# Patient Record
Sex: FEMALE | Race: WHITE | Employment: FULL TIME | URBAN - NONMETROPOLITAN AREA
[De-identification: names, ages, dates, MRNs, and addresses within clinical notes are randomized per-mention and may not be internally consistent; named-entity substitution may affect disease eponyms.]

---

## 2019-10-29 ENCOUNTER — OFFICE VISIT (OUTPATIENT)
Dept: ORTHOPEDIC SURGERY | Age: 29
End: 2019-10-29
Payer: COMMERCIAL

## 2019-10-29 VITALS
HEART RATE: 96 BPM | SYSTOLIC BLOOD PRESSURE: 109 MMHG | HEIGHT: 66 IN | DIASTOLIC BLOOD PRESSURE: 70 MMHG | WEIGHT: 132.06 LBS | BODY MASS INDEX: 21.22 KG/M2

## 2019-10-29 DIAGNOSIS — M25.512 LEFT SHOULDER PAIN, UNSPECIFIED CHRONICITY: ICD-10-CM

## 2019-10-29 DIAGNOSIS — M25.312 INSTABILITY OF LEFT SHOULDER JOINT: Primary | ICD-10-CM

## 2019-10-29 PROCEDURE — 99243 OFF/OP CNSLTJ NEW/EST LOW 30: CPT | Performed by: ORTHOPAEDIC SURGERY

## 2020-11-08 ENCOUNTER — APPOINTMENT (OUTPATIENT)
Dept: GENERAL RADIOLOGY | Age: 30
End: 2020-11-08
Payer: COMMERCIAL

## 2020-11-08 ENCOUNTER — HOSPITAL ENCOUNTER (EMERGENCY)
Age: 30
Discharge: HOME | End: 2020-11-08
Attending: STUDENT IN AN ORGANIZED HEALTH CARE EDUCATION/TRAINING PROGRAM
Payer: COMMERCIAL

## 2020-11-08 VITALS
TEMPERATURE: 98.3 F | SYSTOLIC BLOOD PRESSURE: 130 MMHG | BODY MASS INDEX: 21.99 KG/M2 | DIASTOLIC BLOOD PRESSURE: 74 MMHG | OXYGEN SATURATION: 99 % | RESPIRATION RATE: 18 BRPM | HEART RATE: 78 BPM | HEIGHT: 65 IN | WEIGHT: 132 LBS

## 2020-11-08 LAB
A/G RATIO: 2.2 (ref 1.1–2.2)
ALBUMIN SERPL-MCNC: 5.2 G/DL (ref 3.4–5)
ALP BLD-CCNC: 68 U/L (ref 40–129)
ALT SERPL-CCNC: 18 U/L (ref 10–40)
ANION GAP SERPL CALCULATED.3IONS-SCNC: 13 MMOL/L (ref 3–16)
AST SERPL-CCNC: 22 U/L (ref 15–37)
BASOPHILS ABSOLUTE: 0.1 K/UL (ref 0–0.2)
BASOPHILS RELATIVE PERCENT: 0.4 %
BILIRUB SERPL-MCNC: <0.2 MG/DL (ref 0–1)
BILIRUBIN URINE: NEGATIVE
BLOOD, URINE: NEGATIVE
BUN BLDV-MCNC: 6 MG/DL (ref 7–20)
CALCIUM SERPL-MCNC: 9.8 MG/DL (ref 8.3–10.6)
CHLORIDE BLD-SCNC: 99 MMOL/L (ref 99–110)
CLARITY: CLEAR
CO2: 24 MMOL/L (ref 21–32)
COLOR: YELLOW
CREAT SERPL-MCNC: 0.7 MG/DL (ref 0.6–1.1)
EOSINOPHILS ABSOLUTE: 0 K/UL (ref 0–0.6)
EOSINOPHILS RELATIVE PERCENT: 0 %
GFR AFRICAN AMERICAN: >60
GFR NON-AFRICAN AMERICAN: >60
GLOBULIN: 2.4 G/DL
GLUCOSE BLD-MCNC: 93 MG/DL (ref 70–99)
GLUCOSE URINE: NEGATIVE MG/DL
HCG QUALITATIVE: NEGATIVE
HCT VFR BLD CALC: 42.2 % (ref 36–48)
HEMOGLOBIN: 14.4 G/DL (ref 12–16)
KETONES, URINE: NEGATIVE MG/DL
LEUKOCYTE ESTERASE, URINE: NEGATIVE
LYMPHOCYTES ABSOLUTE: 2.2 K/UL (ref 1–5.1)
LYMPHOCYTES RELATIVE PERCENT: 12.1 %
MCH RBC QN AUTO: 32.1 PG (ref 26–34)
MCHC RBC AUTO-ENTMCNC: 34 G/DL (ref 31–36)
MCV RBC AUTO: 94.6 FL (ref 80–100)
MICROSCOPIC EXAMINATION: NORMAL
MONOCYTES ABSOLUTE: 0.8 K/UL (ref 0–1.3)
MONOCYTES RELATIVE PERCENT: 4.5 %
NEUTROPHILS ABSOLUTE: 15.1 K/UL (ref 1.7–7.7)
NEUTROPHILS RELATIVE PERCENT: 83 %
NITRITE, URINE: NEGATIVE
PDW BLD-RTO: 12.7 % (ref 12.4–15.4)
PH UA: 5.5 (ref 5–8)
PLATELET # BLD: 396 K/UL (ref 135–450)
PMV BLD AUTO: 7.9 FL (ref 5–10.5)
POTASSIUM REFLEX MAGNESIUM: 4 MMOL/L (ref 3.5–5.1)
PROTEIN UA: NEGATIVE MG/DL
RBC # BLD: 4.47 M/UL (ref 4–5.2)
SODIUM BLD-SCNC: 136 MMOL/L (ref 136–145)
SPECIFIC GRAVITY UA: <=1.005 (ref 1–1.03)
TOTAL PROTEIN: 7.6 G/DL (ref 6.4–8.2)
URINE TYPE: NORMAL
UROBILINOGEN, URINE: 0.2 E.U./DL
WBC # BLD: 18.2 K/UL (ref 4–11)

## 2020-11-08 PROCEDURE — 71045 X-RAY EXAM CHEST 1 VIEW: CPT

## 2020-11-08 PROCEDURE — 85025 COMPLETE CBC W/AUTO DIFF WBC: CPT

## 2020-11-08 PROCEDURE — 36415 COLL VENOUS BLD VENIPUNCTURE: CPT

## 2020-11-08 PROCEDURE — 99285 EMERGENCY DEPT VISIT HI MDM: CPT

## 2020-11-08 PROCEDURE — 80053 COMPREHEN METABOLIC PANEL: CPT

## 2020-11-08 PROCEDURE — 84703 CHORIONIC GONADOTROPIN ASSAY: CPT

## 2020-11-08 PROCEDURE — 81003 URINALYSIS AUTO W/O SCOPE: CPT

## 2020-11-08 PROCEDURE — 93005 ELECTROCARDIOGRAM TRACING: CPT | Performed by: STUDENT IN AN ORGANIZED HEALTH CARE EDUCATION/TRAINING PROGRAM

## 2020-11-08 PROCEDURE — U0003 INFECTIOUS AGENT DETECTION BY NUCLEIC ACID (DNA OR RNA); SEVERE ACUTE RESPIRATORY SYNDROME CORONAVIRUS 2 (SARS-COV-2) (CORONAVIRUS DISEASE [COVID-19]), AMPLIFIED PROBE TECHNIQUE, MAKING USE OF HIGH THROUGHPUT TECHNOLOGIES AS DESCRIBED BY CMS-2020-01-R: HCPCS

## 2020-11-08 RX ORDER — CHOLECALCIFEROL (VITAMIN D3) 125 MCG
500 CAPSULE ORAL DAILY
COMMUNITY

## 2020-11-08 RX ORDER — BUTALBITAL, ACETAMINOPHEN, CAFFEINE AND CODEINE PHOSPHATE 300; 50; 40; 30 MG/1; MG/1; MG/1; MG/1
1 CAPSULE ORAL EVERY 4 HOURS PRN
COMMUNITY

## 2020-11-08 RX ORDER — DOXYCYCLINE HYCLATE 100 MG/1
100 CAPSULE ORAL 2 TIMES DAILY
COMMUNITY

## 2020-11-08 RX ORDER — MODAFINIL 100 MG/1
100 TABLET ORAL DAILY
COMMUNITY

## 2020-11-08 RX ORDER — FLUOXETINE HYDROCHLORIDE 20 MG/1
40 CAPSULE ORAL DAILY
COMMUNITY

## 2020-11-08 RX ORDER — MULTIVIT WITH MINERALS/LUTEIN
250 TABLET ORAL DAILY
COMMUNITY

## 2020-11-08 ASSESSMENT — VISUAL ACUITY
OD: 20/25
OU: 20/25
OS: 20/30

## 2020-11-08 ASSESSMENT — PAIN DESCRIPTION - DESCRIPTORS: DESCRIPTORS: CONSTANT;PRESSURE

## 2020-11-08 ASSESSMENT — PAIN SCALES - GENERAL: PAINLEVEL_OUTOF10: 4

## 2020-11-08 ASSESSMENT — PAIN DESCRIPTION - LOCATION: LOCATION: HEAD

## 2020-11-08 NOTE — ED NOTES
Pt states \"I just don't feel right. \" States onset of s/s x approx 1 wk. Pt denies fevers, SOB or loss of taste or smell.  Resps even and unlab     Nette Castaneda RN  11/08/20 9701

## 2020-11-08 NOTE — ED PROVIDER NOTES
[facility] EMERGENCY DEPARTMENT      CHIEF COMPLAINT  Numbness (C/o HA with BLE numbness and general weakness x approx 1 wk)       HISTORY OF PRESENT ILLNESS  Tiara Dewitt is a 31 y.o. female with a past medical history of IBS and MS, who presents to the ED complaining of URI symptoms. Reports fatigue and malaise. Reports loss of sense of taste and smell- 1 week ago. Reports nonproductive cough, fever (Tmax 100.1 on Friday). Tested for flu and covid 2 days ago- negative     Chronic diarrhea from IBS. Patient reports generalized numbness that has been progressive over the past week. She reports that she feels her baseline neuropathy in hands and feet is worse than baseline. She reports intermittent mild vision changes- none currently. She states it feels like previous MS exacerbations but worse. She is on steroids from her PCP.     Her Neurologist is with [facility], was unable to see him because he's on vacation. She denies shortness of breath, chest pain, focal numbness or weakness. No other complaints, modifying factors or associated symptoms. I have reviewed the following from the nursing documentation. Past Medical History:   Diagnosis Date    Anxiety     Depression     IBS (irritable bowel syndrome)     MS (multiple sclerosis) (HCC)     Tonsillitis      Past Surgical History:   Procedure Laterality Date    COLONOSCOPY      MOUTH SURGERY      TONSILLECTOMY       History reviewed. No pertinent family history.   Social History     Socioeconomic History    Marital status: Single     Spouse name: Not on file    Number of children: Not on file    Years of education: Not on file    Highest education level: Not on file   Occupational History    Not on file   Social Needs    Financial resource strain: Not on file    Food insecurity     Worry: Not on file     Inability: Not on file    Transportation needs     Medical: Not on file     Non-medical: Not on file   Tobacco Use    Smoking status: Current Every Day Smoker     Packs/day: 1.00     Types: Cigarettes    Smokeless tobacco: Never Used   Substance and Sexual Activity    Alcohol use: No    Drug use: No    Sexual activity: Not Currently   Lifestyle    Physical activity     Days per week: Not on file     Minutes per session: Not on file    Stress: Not on file   Relationships    Social connections     Talks on phone: Not on file     Gets together: Not on file     Attends Jewish service: Not on file     Active member of club or organization: Not on file     Attends meetings of clubs or organizations: Not on file     Relationship status: Not on file    Intimate partner violence     Fear of current or ex partner: Not on file     Emotionally abused: Not on file     Physically abused: Not on file     Forced sexual activity: Not on file   Other Topics Concern    Not on file   Social History Narrative    Not on file     No current facility-administered medications for this encounter. Current Outpatient Medications   Medication Sig Dispense Refill    FLUoxetine (PROZAC) 20 MG capsule Take 40 mg by mouth daily      methylPREDNISolone (MEDROL PO) Take by mouth States she started yesterday      doxycycline hyclate (VIBRAMYCIN) 100 MG capsule Take 100 mg by mouth 2 times daily States she only has taken 1 dose      vitamin B-12 (CYANOCOBALAMIN) 500 MCG tablet Take 500 mcg by mouth daily      Ascorbic Acid (VITAMIN C) 250 MG tablet Take 250 mg by mouth daily      butalbital-acetaminophen-caffeine-codeine (FIORICET/CODEINE) -52-30 MG per capsule Take 1 capsule by mouth every 4 hours as needed.  loperamide (IMODIUM) 1 MG/5ML solution Take by mouth 4 times daily as needed for Diarrhea      modafinil (PROVIGIL) 100 MG tablet Take 100 mg by mouth daily.       gabapentin (NEURONTIN) 100 MG capsule Take 1 capsule by mouth 3 times daily for 15 days (Patient taking differently: Take 600 mg by mouth 3 times daily. ) 45 capsule 0    levonorgestrel-ethinyl estradiol (AVIANE) 0.1-20 MG-MCG per tablet Take 1 tablet by mouth daily 1 packet 11     Allergies   Allergen Reactions    Ciprofloxacin Shortness Of Breath    Azithromycin Diarrhea    Sulfamethoxazole-Trimethoprim        REVIEW OF SYSTEMS  10 systems reviewed, pertinent positives per HPI otherwise noted to be negative. PHYSICAL EXAM  /75   Pulse 90   Temp 98.5 °F (36.9 °C)   Resp 18   Ht 5' 5\" (1.651 m)   Wt 132 lb (59.9 kg)   LMP 09/15/2020   SpO2 100%   BMI 21.97 kg/m²    GENERAL APPEARANCE: Awake and alert. Cooperative. no distress. HENT: Normocephalic. Atraumatic. Mucous membranes are moist  NECK: Supple. Full range of motion of the neck without stiffness or pain. EYES: PERRL. EOM's grossly intact. Visual acuity R 20/25; L 20/30  HEART/CHEST: RRR. No murmurs. Chest wall is not tender to palpation. LUNGS: Respirations unlabored. CTAB. Good air exchange. Speaking comfortably in full sentences. ABDOMEN: No tenderness. Soft. Non-distended. No masses. No organomegaly. No guarding or rebound. MUSCULOSKELETAL: No extremity edema. Compartments soft. No deformity. No tenderness in the extremities. All extremities neurovascularly intact. SKIN: Warm and dry. No acute rashes. NEUROLOGICAL: Alert and oriented. CN's 2-12 intact. No gross facial drooping. Strength 5/5, sensation intact. NIH SS of 0.  PSYCHIATRIC: Normal mood and affect. LABS  I have reviewed all labs for this visit.    Results for orders placed or performed during the hospital encounter of 11/08/20   CBC Auto Differential   Result Value Ref Range    WBC 18.2 (H) 4.0 - 11.0 K/uL    RBC 4.47 4.00 - 5.20 M/uL    Hemoglobin 14.4 12.0 - 16.0 g/dL    Hematocrit 42.2 36.0 - 48.0 %    MCV 94.6 80.0 - 100.0 fL    MCH 32.1 26.0 - 34.0 pg    MCHC 34.0 31.0 - 36.0 g/dL    RDW 12.7 12.4 - 15.4 %    Platelets 002 866 - 406 K/uL    MPV 7.9 5.0 - 10.5 fL    Neutrophils % 83.0 %    Lymphocytes % 12.1 % Monocytes % 4.5 %    Eosinophils % 0.0 %    Basophils % 0.4 %    Neutrophils Absolute 15.1 (H) 1.7 - 7.7 K/uL    Lymphocytes Absolute 2.2 1.0 - 5.1 K/uL    Monocytes Absolute 0.8 0.0 - 1.3 K/uL    Eosinophils Absolute 0.0 0.0 - 0.6 K/uL    Basophils Absolute 0.1 0.0 - 0.2 K/uL   Comprehensive Metabolic Panel w/ Reflex to MG   Result Value Ref Range    Sodium 136 136 - 145 mmol/L    Potassium reflex Magnesium 4.0 3.5 - 5.1 mmol/L    Chloride 99 99 - 110 mmol/L    CO2 24 21 - 32 mmol/L    Anion Gap 13 3 - 16    Glucose 93 70 - 99 mg/dL    BUN 6 (L) 7 - 20 mg/dL    CREATININE 0.7 0.6 - 1.1 mg/dL    GFR Non-African American >60 >60    GFR African American >60 >60    Calcium 9.8 8.3 - 10.6 mg/dL    Total Protein 7.6 6.4 - 8.2 g/dL    Alb 5.2 (H) 3.4 - 5.0 g/dL    Albumin/Globulin Ratio 2.2 1.1 - 2.2    Total Bilirubin <0.2 0.0 - 1.0 mg/dL    Alkaline Phosphatase 68 40 - 129 U/L    ALT 18 10 - 40 U/L    AST 22 15 - 37 U/L    Globulin 2.4 g/dL   Urinalysis, reflex to microscopic   Result Value Ref Range    Color, UA Yellow Straw/Yellow    Clarity, UA Clear Clear    Glucose, Ur Negative Negative mg/dL    Bilirubin Urine Negative Negative    Ketones, Urine Negative Negative mg/dL    Specific Gravity, UA <=1.005 1.005 - 1.030    Blood, Urine Negative Negative    pH, UA 5.5 5.0 - 8.0    Protein, UA Negative Negative mg/dL    Urobilinogen, Urine 0.2 <2.0 E.U./dL    Nitrite, Urine Negative Negative    Leukocyte Esterase, Urine Negative Negative    Microscopic Examination Not Indicated     Urine Type NotGiven    HCG Qualitative, Serum   Result Value Ref Range    hCG Qual Negative Detects HCG level >10 MIU/mL   COVID-19   Result Value Ref Range    SARS-CoV-2, PCR Not Detected Not Detected   EKG 12 Lead   Result Value Ref Range    Ventricular Rate 91 BPM    Atrial Rate 91 BPM    P-R Interval 140 ms    QRS Duration 80 ms    Q-T Interval 348 ms    QTc Calculation (Bazett) 428 ms    P Axis 74 degrees    R Axis 67 degrees    T Axis 70 degrees    Diagnosis       Normal sinus rhythmNormal ECGNo previous ECGs availableConfirmed by Annmarie Kim MD, [address] ([id number]) on 11/9/2020 5:40:41 AM       ECG  The Ekg interpreted by me shows  normal sinus rhythm with a rate of 91  Axis is   Normal  QTc is  within an acceptable range  Intervals and Durations are unremarkable. ST Segments: no acute change  No significant change from prior EKG dated 11/14/12    RADIOLOGY    XR CHEST PORTABLE   Final Result   No acute cardiopulmonary disease. ED COURSE / MDM  Patient seen and evaluated. Old records reviewed. Labs and imaging reviewed and results discussed with patient. Overall well appearing patient, in no acute distress, presenting for hx of MS and URI symptoms that she thinks are exacerbating her MS symptoms. Physical exam remarkable for intact neurologic exam.     Differential diagnosis includes but is not limited to: MS flare, electrolyte abnormalities, COVID, pneumonia, UTI, viral illness doubt stroke, meningitis     EKG, laboratory studies, and chest x-ray obtained. ED Course as of Nov 11 1635   Rosendo Crenshaw Nov 08, 2020   1537 No electrolyte abnormalities or evidence of kidney dysfunction.    [ER]   1537 Liver Function testing unremarkable. Do not suspect hepatitis or acute liver pathology. [ER]   1543 Leukocytosis, no anemia or thrombocytopenia. [ER]   1543 Urinalysis shows no evidence of blood or infection. Do not suspect UTI or kidney stone.    [ER]   1543 Patient is not pregnant.    [ER]   1543 CXR: FINDINGS:  The cardiac silhouette, mediastinal and hilar contours are normal.  The lungs  are clear. There are no pleural effusions. No acute osseous abnormalities are  identified.     IMPRESSION:  No acute cardiopulmonary disease.    [ER]      ED Course User Index  [ER] Lana Pacheco MD        During the patient's ED course, the patient was given:  Medications - No data to display     Workup remarkable for leukocytosis.  No electrolyte abnormalities. No evidence of UTI or Pneumonia. Suspect viral illness. COVID pending, high suspicion for COVID given patient reporting loss of taste and smell. Spoke to on call Neurologist regarding patient's symptoms and laboratory studies. They had no acute concerns at this time. They did not recommend any specific treatment and recommended outpatient follow up with patient's neurologist.    Work-up overall reassuring. At this time, feel the patient is appropriate for discharge to follow-up with a primary care doctor and Neurologist.  Patient feels comfortable with discharge at this time. Return precautions given. Encouraged Neurology office follow up tomorrow- said even if her Neurologist is out of town to see one of his partners. Patient discharged in stable condition. CLINICAL IMPRESSION  1. Acute upper respiratory infection    2. Multiple sclerosis (HCC)        Blood pressure 130/74, pulse 78, temperature 98.3 °F (36.8 °C), temperature source Oral, resp. rate 18, height 5' 5\" (1.651 m), weight 132 lb (59.9 kg), last menstrual period 09/15/2020, SpO2 99 %, not currently breastfeeding. DISPOSITION  Avni Pedraza was discharged to home in stable condition. Patient was given scripts for the following medications. I counseled patient how to take these medications. Discharge Medication List as of 11/8/2020  4:24 PM          Follow-up with:  Your Neurologist    Call in 1 day      [facility] Emergency Department  [address] [place] [address],[address]  [phone]  Go to   As needed, If symptoms worsen    Rhonda Wick  [facility] Cathy Garcia, EMILIANA [facility]  [place] [address]  [phone]            DISCLAIMER: This chart was created using Dragon dictation software. Efforts were made by me to ensure accuracy, however some errors may be present due to limitations of this technology and occasionally words are not transcribed correctly.         Segundo Burgos MD  11/11/20 1640

## 2020-11-09 LAB
EKG ATRIAL RATE: 91 BPM
EKG DIAGNOSIS: NORMAL
EKG P AXIS: 74 DEGREES
EKG P-R INTERVAL: 140 MS
EKG Q-T INTERVAL: 348 MS
EKG QRS DURATION: 80 MS
EKG QTC CALCULATION (BAZETT): 428 MS
EKG R AXIS: 67 DEGREES
EKG T AXIS: 70 DEGREES
EKG VENTRICULAR RATE: 91 BPM
SARS-COV-2, PCR: NOT DETECTED

## 2020-11-09 PROCEDURE — 93010 ELECTROCARDIOGRAM REPORT: CPT | Performed by: INTERNAL MEDICINE
